# Patient Record
Sex: FEMALE | Race: WHITE | NOT HISPANIC OR LATINO | Employment: FULL TIME | URBAN - METROPOLITAN AREA
[De-identification: names, ages, dates, MRNs, and addresses within clinical notes are randomized per-mention and may not be internally consistent; named-entity substitution may affect disease eponyms.]

---

## 2017-01-18 ENCOUNTER — OUTPATIENT INFUSION SERVICES (OUTPATIENT)
Dept: ONCOLOGY | Facility: MEDICAL CENTER | Age: 72
End: 2017-01-18
Attending: FAMILY MEDICINE
Payer: MEDICARE

## 2017-01-18 VITALS
BODY MASS INDEX: 20.37 KG/M2 | HEIGHT: 66 IN | OXYGEN SATURATION: 95 % | HEART RATE: 74 BPM | WEIGHT: 126.76 LBS | DIASTOLIC BLOOD PRESSURE: 89 MMHG | TEMPERATURE: 97.2 F | SYSTOLIC BLOOD PRESSURE: 129 MMHG | RESPIRATION RATE: 18 BRPM

## 2017-01-18 LAB
CA-I BLD ISE-SCNC: 1.14 MMOL/L (ref 1.1–1.3)
CREAT BLD-MCNC: 0.6 MG/DL (ref 0.5–1.4)

## 2017-01-18 PROCEDURE — 700111 HCHG RX REV CODE 636 W/ 250 OVERRIDE (IP): Performed by: FAMILY MEDICINE

## 2017-01-18 PROCEDURE — 82565 ASSAY OF CREATININE: CPT

## 2017-01-18 PROCEDURE — 82330 ASSAY OF CALCIUM: CPT

## 2017-01-18 PROCEDURE — 96401 CHEMO ANTI-NEOPL SQ/IM: CPT

## 2017-01-18 RX ORDER — CALCIUM CARBONATE 500(1250)
500 TABLET ORAL 2 TIMES DAILY WITH MEALS
COMMUNITY
End: 2017-06-19

## 2017-01-18 RX ADMIN — DENOSUMAB 60 MG: 60 INJECTION SUBCUTANEOUS at 14:26

## 2017-01-18 NOTE — PROGRESS NOTES
Pt arrived to [facility], ambulatory, for Prolia injection. Pt denies any recent or upcoming dental surgeries. Labs drawn with 23g butterfly needle in the R-AC, pt within parameters to treat today. Prolia injected into the R-upper arm with no s/sx of adverse reaction. Pt left [facility] with no s/sx of distress. Follow up appointment scheduled and confirmed.

## 2017-01-18 NOTE — PROGRESS NOTES
Pharmacy Note:    Scr = 0.6    with est crcl ~ 69ml/min(min Scr = 0.7)  iCalcium =   1.14      Ok to proceed with prolia injection  DEQUAN Regalado Pharm.D.

## 2017-02-09 ENCOUNTER — NON-PROVIDER VISIT (OUTPATIENT)
Dept: NEUROLOGY | Facility: MEDICAL CENTER | Age: 72
End: 2017-02-09
Payer: MEDICARE

## 2017-02-09 DIAGNOSIS — G90.50 COMPLEX REGIONAL PAIN SYNDROME TYPE 1, AFFECTING UNSPECIFIED SITE: ICD-10-CM

## 2017-02-09 PROCEDURE — 95909 NRV CNDJ TST 5-6 STUDIES: CPT | Performed by: PSYCHIATRY & NEUROLOGY

## 2017-02-09 PROCEDURE — 95885 MUSC TST DONE W/NERV TST LIM: CPT | Mod: 59 | Performed by: PSYCHIATRY & NEUROLOGY

## 2017-02-09 PROCEDURE — 95886 MUSC TEST DONE W/N TEST COMP: CPT | Mod: 59 | Performed by: PSYCHIATRY & NEUROLOGY

## 2017-02-09 NOTE — MR AVS SNAPSHOT
"        Jessica Mandel   2017 3:15 PM   Appointment   MRN: [id number]    Department:  [facility]   Dept Phone:  [phone]    Description:  Female : 1945   Provider:  [facility]           Allergies as of 2017     Allergen Noted Reactions    Benadryl [Altaryl] 2014       \"Makes me hyper\"    Other Environmental 2014   Runny Nose    seasonal hayfever,       Vital Signs     Smoking Status                   Never Smoker            Basic Information     Date Of Birth Sex Race Ethnicity Preferred Language    1945 Female White Non- English      Your appointments     2017  1:30 PM   Est Injection with INFUSION QUICK INJECT   Infusion Services ([facility])    [address] [facility] [address]  [place] [address] [phone]              Problem List              ICD-10-CM Priority Class Noted - Resolved    Generalized pain R52   2014 - Present    Nonunion of fracture [id number]   2014 - Present      Health Maintenance        Date Due Completion Dates    IMM DTaP/Tdap/Td Vaccine (1 - Tdap) 1966 ---    PAP SMEAR 1968 ---    COLONOSCOPY 1997 ---    IMM ZOSTER VACCINE 2007 ---    IMM PNEUMOCOCCAL 65+ (ADULT) LOW/MEDIUM RISK SERIES (1 of 2 - PCV13) 2012 ---    IMM INFLUENZA (1) 2016 ---    MAMMOGRAM 2017, 2015, 2012, 10/8/2010, 10/18/2005    BONE DENSITY 10/4/2021 10/4/2016, 10/2/2015, 10/27/2014, 10/24/2013, 10/8/2010, 10/18/2005            Current Immunizations     No immunizations on file.      Below and/or attached are the medications your provider expects you to take. Review all of your home medications and newly ordered medications with your provider and/or pharmacist. Follow medication instructions as directed by your provider and/or pharmacist. Please keep your medication list with you and share with your provider. Update the information when medications are discontinued, doses are changed, or new " medications (including over-the-counter products) are added; and carry medication information at all times in the event of emergency situations     Allergies:  BENADRYL - (reactions not documented)     OTHER ENVIRONMENTAL - Runny Nose               Medications  Valid as of: February 09, 2017 -  4:43 PM    Generic Name Brand Name Tablet Size Instructions for use    ALPRAZolam (Tab) XANAX 0.5 MG Take 0.5 mg by mouth 2 times a day as needed.        Ascorbic Acid (Tab) Vitamin C 1000 MG Take  by mouth every day.        Aspirin (Tab) aspirin 81 MG Take 81 mg by mouth every day.        Calcium-Magnesium (Tab) Calcium-Magnesium 500-250 MG Take 1 Tab by mouth 2 Times a Day.        Cholecalciferol (Cap) VITAMIN D3 5000 UNIT Take 10,000 Units by mouth every day.        Cholecalciferol (Tab) cholecalciferol 1000 UNIT Take 1,000 Units by mouth every day.        Coenzyme Q10 (Cap) Coenzyme Q10 200 MG Take  by mouth every day.        Gabapentin (Cap) NEURONTIN 400 MG Take 400 mg by mouth 3 times a day.        Glucosamine-Chondroitin-MSM   Take 1 Tab by mouth every day.        Losartan Potassium (Tab) COZAAR 100 MG Take 100 mg by mouth every morning.        Melatonin (Tab) Melatonin 10 MG Take 1 Tab by mouth every evening.        Omega-3 Fatty Acids   Take 1 Tab by mouth.        Oxycodone-Acetaminophen (Tab) PERCOCET 5-325 MG Take 1-2 Tabs by mouth 2 Times a Day.        Oyster Shell (Tab) OS-RADHA 500 500 MG Take 500 mg by mouth 2 times a day, with meals.        Vitamin E (Cap) VITAMIN E 400 UNIT Take 400 Units by mouth every day. With tocopherols        Zolpidem Tartrate (Tab) AMBIEN 10 MG Take 10 mg by mouth at bedtime as needed.        .                 Medicines prescribed today were sent to:     [facility]/PHARMACY #[id number] - [place], [place] - [address]    [address] [place] [address]    Phone: [phone] Fax: [phone]    Open 24 Hours?: No      Medication refill instructions:       If your prescription bottle indicates  you have medication refills left, it is not necessary to call your provider’s office. Please contact your pharmacy and they will refill your medication.    If your prescription bottle indicates you do not have any refills left, you may request refills at any time through one of the following ways: The online Typesafe system (except Urgent Care), by calling your provider’s office, or by asking your pharmacy to contact your provider’s office with a refill request. Medication refills are processed only during regular business hours and may not be available until the next business day. Your provider may request additional information or to have a follow-up visit with you prior to refilling your medication.   *Please Note: Medication refills are assigned a new Rx number when refilled electronically. Your pharmacy may indicate that no refills were authorized even though a new prescription for the same medication is available at the pharmacy. Please request the medicine by name with the pharmacy before contacting your provider for a refill.           Typesafe Access Code: Activation code not generated  Current Typesafe Status: Active

## 2017-02-10 NOTE — PROCEDURES
DATE OF SERVICE:    The patient is having a lot of left foot pain since the patient had ankle   surgery in the past 1+ year.  The NCV/EMG shows possible left tarsal tunnel   severely reduced amplitude and also muscle atrophy and denervation or   reactivation change over the left abductor hallucis.  Also, some reinnervation   change on the left proximal muscle as well and ankle reflex was also absent   in the left.    INTERPRETATION:  This NCV/EMG is consistent with:  1.  Left medial tarsal tunnel syndrome.  2.  Superimposed some chronic lumbar radiculopathy may be possible too on the   left side; however, the patient did not have left back pain and the major   symptoms the patient has is secondary to the distal nerve damage in the tarsal   tunnel and the muscle is atrophied in the tarsal tunnel innervated foot   muscles.  Advised capsaicin ointment.  Advised podiatrist evaluation and   further surgery over the left tarsal tunnel syndrome would be the best result   and may be of help if that is really what the patient wants.       ____________________________________     MD SAMY VANG / NEY    DD:  02/09/2017 16:41:53  DT:  02/09/2017 17:56:34    D#:  [id number]  Job#:  [id number]    cc: Amor Moreno MD

## 2017-02-11 NOTE — PROCEDURES
DATE OF SERVICE:  02/09/2017    The patient is having a lot of left foot pain since the patient had ankle   surgery  3 year ago.     [facility]  Neurodiagnostic Laboratory  [facility] [place] [address] [phone]      Patient: CELE SINGLETON  Medical Record: [id number]  YOB: 1945  Age: 71 Years 4 Months  Referring MD: TAURUS Truong MD: REJI  Indication: G90.529  Notes: [facility]#[id number]      Sensory NCS      Nerve / Sites Rec. Site Onset Lat Peak Lat NP Amp PP Amp SPAR Segments Distance Velocity     ms ms µV µV %  cm m/s   L SURAL - Lat Mall-PENG      Calf Lat Mall NR NR NR NR NR Calf - Lat Mall 14 NR   R SURAL - Lat Mall-PENG      Calf Lat Mall NR NR NR NR NR Calf - Lat Mall 14 NR           Motor NCS      Nerve / Sites Rec. Site Lat Amp Rel Amp SPAR Segments Dist. Gonzalo     ms mV % %  cm m/s   R COMM PERONEAL - EDB-PENG      Ankle EDB 3.55 3.6 100 100 Ankle - EDB 9       Knee EDB 12.15 2.8 76.4 100 Knee - Ankle 37 43.0   L COMM PERONEAL - EDB-PENG      Ankle EDB 3.80 2.9 100 78.5 Ankle - EDB 9       Knee EDB 10.95 2.3 80.1 82.3 Knee - Ankle 32 44.8   R TIBIAL (KNEE) - EHB-PENG      Ankle EHB 3.10 8.3 100 100 Ankle - EHB 9       Knee EHB 12.45 5.9 70.2 100 Knee - Ankle 36 38.5   L TIBIAL (KNEE) - EHB-PENG      Ankle EHB 3.35 0.8 100 9.76 Ankle - EHB 9       Knee EHB 12.90 1.2 153 21.3 Knee - Ankle 39 40.8               EMG Summary Table     Spontaneous MUAP Recruitment    IA Fib PSW Fasc H.F. Amp Dur. PPP Pattern   L. VAST MEDIALIS N None None None None N N N N   L. VAST LATERALIS N None None None None 1+ 1+ N N   L. TIB ANTERIOR N None None None None N N N N   L. GASTROCN (MED) N None None None None N N N N   L. ABD  HALLUCIS N None None None None 2+ 2+ 1+ N   R. ABD  HALLUCIS N None None None None N N N N       SUMMARY:    The NCV/EMG shows possible left tarsal tunnel   severely reduced amplitude and also muscle atrophy and denervation or   reactivation change over the left  abductor hallucis.  Also, some reinnervation   change on the left proximal muscle as well and ankle reflex was also absent   in the left.    IMPRESSION:    Probable Left medial tarsal tunnel syndrome  May try Capsaicin ointment  Or even consider surgery      Superimposed some chronic lumbar radiculopathy may be possible too on the   left side; however, the patient did not have left back pain and the major   Symptoms are not related with lumbar radiculopathies either    Advised capsaicin ointment.  Advised podiatrist evaluation and   further surgery over the left tarsal tunnel syndrome is possible too       ____________________________________     MD SAMY VANG / NEY    DD:  02/09/2017 16:41:53  DT:  02/09/2017 17:56:34    D#:  [id number]  Job#:  [id number]    cc: Amor Moreno MD

## 2017-02-15 ENCOUNTER — OFFICE VISIT (OUTPATIENT)
Dept: NEUROLOGY | Facility: MEDICAL CENTER | Age: 72
End: 2017-02-15
Payer: MEDICARE

## 2017-02-15 VITALS
WEIGHT: 128.6 LBS | RESPIRATION RATE: 14 BRPM | TEMPERATURE: 99.1 F | HEART RATE: 82 BPM | OXYGEN SATURATION: 95 % | BODY MASS INDEX: 21.43 KG/M2 | DIASTOLIC BLOOD PRESSURE: 78 MMHG | HEIGHT: 65 IN | SYSTOLIC BLOOD PRESSURE: 110 MMHG

## 2017-02-15 DIAGNOSIS — G57.52 TARSAL TUNNEL SYNDROME, LEFT: ICD-10-CM

## 2017-02-15 PROBLEM — G57.50 TARSAL TUNNEL SYNDROME: Status: ACTIVE | Noted: 2017-02-15

## 2017-02-15 PROCEDURE — G8419 CALC BMI OUT NRM PARAM NOF/U: HCPCS | Performed by: PSYCHIATRY & NEUROLOGY

## 2017-02-15 PROCEDURE — 3014F SCREEN MAMMO DOC REV: CPT | Performed by: PSYCHIATRY & NEUROLOGY

## 2017-02-15 PROCEDURE — 1101F PT FALLS ASSESS-DOCD LE1/YR: CPT | Mod: 8P | Performed by: PSYCHIATRY & NEUROLOGY

## 2017-02-15 PROCEDURE — G8484 FLU IMMUNIZE NO ADMIN: HCPCS | Performed by: PSYCHIATRY & NEUROLOGY

## 2017-02-15 PROCEDURE — 1036F TOBACCO NON-USER: CPT | Performed by: PSYCHIATRY & NEUROLOGY

## 2017-02-15 PROCEDURE — 4040F PNEUMOC VAC/ADMIN/RCVD: CPT | Mod: 8P | Performed by: PSYCHIATRY & NEUROLOGY

## 2017-02-15 PROCEDURE — 3017F COLORECTAL CA SCREEN DOC REV: CPT | Mod: 8P | Performed by: PSYCHIATRY & NEUROLOGY

## 2017-02-15 PROCEDURE — 99214 OFFICE O/P EST MOD 30 MIN: CPT | Performed by: PSYCHIATRY & NEUROLOGY

## 2017-02-15 PROCEDURE — G8432 DEP SCR NOT DOC, RNG: HCPCS | Performed by: PSYCHIATRY & NEUROLOGY

## 2017-02-15 RX ORDER — CARBAMAZEPINE 200 MG/1
200 TABLET, EXTENDED RELEASE ORAL 2 TIMES DAILY
Qty: 60 TAB | Refills: 3 | Status: SHIPPED | OUTPATIENT
Start: 2017-02-15 | End: 2017-04-03

## 2017-02-15 RX ORDER — CITALOPRAM 20 MG/1
20 TABLET ORAL DAILY
COMMUNITY
End: 2017-04-03

## 2017-02-15 ASSESSMENT — PAIN SCALES - GENERAL: PAINLEVEL: NO PAIN

## 2017-02-15 NOTE — PATIENT INSTRUCTIONS
IMPRESSION:    1. Left tarsal tunnel syndrome after ankle surgery 3 years ago  2. Intractable pain over left foot due to 1      PLAN/RECOMMENDATIONS:    Advise surgery consultation  Will add Tegretol for pain management  Tegretol 200mg Daily for 3 days, then up to 200mg two times per day  Stop if any side effects, call us if not helpful    SIGNATURE:  Sunshine Martines      CC:  Hasmukh Higginbotham M.D.

## 2017-02-15 NOTE — MR AVS SNAPSHOT
"        Jessica Mandel   2/15/2017 8:20 AM   Office Visit   MRN: [id number]    Department:  [facility]   Dept Phone:  [phone]    Description:  Female : 1945   Provider:  Sunshine Martines M.D.           Reason for Visit     Follow-Up Nerve pain      Allergies as of 2/15/2017     Allergen Noted Reactions    Benadryl [Altaryl] 2014       \"Makes me hyper\"    Other Environmental 2014   Runny Nose    seasonal hayfever,       You were diagnosed with     Tarsal tunnel syndrome, left   [124997]         Vital Signs     Blood Pressure Pulse Temperature Respirations Height Weight    110/78 mmHg 82 37.3 °C (99.1 °F) 14 1.651 m (5' 5\") 58.333 kg (128 lb 9.6 oz)    Body Mass Index Oxygen Saturation Smoking Status             21.40 kg/m2 95% Never Smoker          Basic Information     Date Of Birth Sex Race Ethnicity Preferred Language    1945 Female White Non- English      Your appointments     2017  1:30 PM   Est Injection with INFUSION QUICK INJECT   Infusion Services ([facility])    [address] [facility] [address]  [place] [address]   [phone]              Problem List              ICD-10-CM Priority Class Noted - Resolved    Generalized pain R52   2014 - Present    Nonunion of fracture [id number]   2014 - Present    Tarsal tunnel syndrome G57.50   2/15/2017 - Present      Health Maintenance        Date Due Completion Dates    IMM DTaP/Tdap/Td Vaccine (1 - Tdap) 1966 ---    PAP SMEAR 1968 ---    COLONOSCOPY 1997 ---    IMM ZOSTER VACCINE 2007 ---    IMM PNEUMOCOCCAL 65+ (ADULT) LOW/MEDIUM RISK SERIES (1 of 2 - PCV13) 2012 ---    IMM INFLUENZA (1) 2016 ---    MAMMOGRAM 2017, 2015, 2012, 10/8/2010, 10/18/2005    BONE DENSITY 10/4/2021 10/4/2016, 10/2/2015, 10/27/2014, 10/24/2013, 10/8/2010, 10/18/2005            Current Immunizations     No immunizations on file.      Below and/or attached are the medications your provider " expects you to take. Review all of your home medications and newly ordered medications with your provider and/or pharmacist. Follow medication instructions as directed by your provider and/or pharmacist. Please keep your medication list with you and share with your provider. Update the information when medications are discontinued, doses are changed, or new medications (including over-the-counter products) are added; and carry medication information at all times in the event of emergency situations     Allergies:  BENADRYL - (reactions not documented)     OTHER ENVIRONMENTAL - Runny Nose               Medications  Valid as of: February 15, 2017 -  9:00 AM    Generic Name Brand Name Tablet Size Instructions for use    ALPRAZolam (Tab) XANAX 0.5 MG Take 0.5 mg by mouth 2 times a day as needed.        Ascorbic Acid (Tab) Vitamin C 1000 MG Take  by mouth every day.        Aspirin (Tab) aspirin 81 MG Take 81 mg by mouth every day.        Calcium-Magnesium (Tab) Calcium-Magnesium 500-250 MG Take 1 Tab by mouth 2 Times a Day.        CarBAMazepine (TABLET SR 12 HR) TEGRETOL  MG Take 1 Tab by mouth 2 times a day.        Cholecalciferol (Cap) VITAMIN D3 5000 UNIT Take 10,000 Units by mouth every day.        Cholecalciferol (Tab) cholecalciferol 1000 UNIT Take 1,000 Units by mouth every day.        Citalopram Hydrobromide (Tab) CELEXA 20 MG Take 20 mg by mouth every day.        Coenzyme Q10 (Cap) Coenzyme Q10 200 MG Take  by mouth every day.        Gabapentin (Cap) NEURONTIN 400 MG Take 400 mg by mouth 3 times a day.        Glucosamine-Chondroitin-MSM   Take 1 Tab by mouth every day.        Losartan Potassium (Tab) COZAAR 100 MG Take 100 mg by mouth every morning.        Melatonin (Tab) Melatonin 10 MG Take 1 Tab by mouth every evening.        Omega-3 Fatty Acids   Take 1 Tab by mouth.        Oxycodone-Acetaminophen (Tab) PERCOCET 5-325 MG Take 1-2 Tabs by mouth 2 Times a Day.        Oyster Shell (Tab) OS-RADHA 500 500 MG  Take 500 mg by mouth 2 times a day, with meals.        Vitamin E (Cap) VITAMIN E 400 UNIT Take 400 Units by mouth every day. With tocopherols        Zolpidem Tartrate (Tab) AMBIEN 10 MG Take 10 mg by mouth at bedtime as needed.        .                 Medicines prescribed today were sent to:     [facility]/PHARMACY #[id number] - [place], [place] - [address]    [address] [place] [address]    Phone: [phone] Fax: [phone]    Open 24 Hours?: No      Medication refill instructions:       If your prescription bottle indicates you have medication refills left, it is not necessary to call your provider’s office. Please contact your pharmacy and they will refill your medication.    If your prescription bottle indicates you do not have any refills left, you may request refills at any time through one of the following ways: The online [employer] system (except Urgent Care), by calling your provider’s office, or by asking your pharmacy to contact your provider’s office with a refill request. Medication refills are processed only during regular business hours and may not be available until the next business day. Your provider may request additional information or to have a follow-up visit with you prior to refilling your medication.   *Please Note: Medication refills are assigned a new Rx number when refilled electronically. Your pharmacy may indicate that no refills were authorized even though a new prescription for the same medication is available at the pharmacy. Please request the medicine by name with the pharmacy before contacting your provider for a refill.        Instructions          IMPRESSION:    1. Left tarsal tunnel syndrome after ankle surgery 3 years ago  2. Intractable pain over left foot due to 1      PLAN/RECOMMENDATIONS:    Advise surgery consultation  Will add Tegretol for pain management  Tegretol 200mg Daily for 3 days, then up to 200mg two times per day  Stop if any side effects, call us if not  helpful    SIGNATURE:  Sunshine Martines      CC:  Hasmukh Higginbotham M.D.              MyChart Access Code: Activation code not generated  Current MyChart Status: Active

## 2017-02-15 NOTE — PROGRESS NOTES
NEUROLOGY NOTE    Referring Physician  Hasmukh Higginbotham      CHIEF COMPLAINT:      The patient is having a lot of left foot pain since the patient had ankle    surgery in the past 3 year.  The NCV/EMG shows possible left tarsal tunnel    severely reduced amplitude and also muscle atrophy and denervation or    reactivation change over the left abductor hallucis.  Also, some reinnervation   change on the left proximal muscle as well and ankle reflex was also absent    in the left.  Chief Complaint   Patient presents with   • Follow-Up     Nerve pain       PRESENT ILLNESS:   The patient is having a lot of left foot pain since the patient had ankle    surgery in the past 3 year.  The NCV/EMG shows possible left tarsal tunnel    severely reduced amplitude and also muscle atrophy and denervation or    reactivation change over the left abductor hallucis.  Also, some reinnervation   change on the left proximal muscle as well and ankle reflex was also absent    in the left.    PAST MEDICAL HISTORY:  Past Medical History   Diagnosis Date   • Hypertension    • Cholesterol blood decreased    • Seasonal and perennial allergic rhinitis      hayfever uses otc rx   • Arthritis    • Dental disorder      dental implants, upper partial   • Pain      left ankle,neck,shoulder   • High cholesterol    • Psychiatric problem      anxiety/depression seeing therapist       PAST SURGICAL HISTORY:  Past Surgical History   Procedure Laterality Date   • Breast biopsy  1990     right breast, benign   • Pr enlarge breast with implant    1986   • Inj,sacroiliac,anes/steroid  4/2/2014     Performed by Amor Moreno M.D. at [facility]   • Gyn surgery  1985     hysterectomy   • Bone graft  8/29/2014     Performed by Hernesto Garcia M.D. at [facility]   • Ankle arthroscopy  8/29/2014     Performed by Hernesto Garcia M.D. at [facility]   • Knee arthroscopy  2007     LEFT KNEE   • Hardware removal ortho Left  "6/24/2015     Procedure: HARDWARE REMOVAL ORTHO, Ankle;  Surgeon: Hernesto Garcia M.D.;  Location: [facility];  Service:        FAMILY HISTORY:  No family history on file.    SOCIAL HISTORY:  Social History     Social History   • Marital Status:      Spouse Name: N/A   • Number of Children: N/A   • Years of Education: N/A     Occupational History   • Not on file.     Social History Main Topics   • Smoking status: Never Smoker    • Smokeless tobacco: Never Used   • Alcohol Use: No   • Drug Use: No   • Sexual Activity: No     Other Topics Concern   • Not on file     Social History Narrative     ALLERGIES:  Allergies   Allergen Reactions   • Benadryl [Altaryl]      \"Makes me hyper\"   • Other Environmental Runny Nose     seasonal hayfever,      TOBHX  History   Smoking status   • Never Smoker    Smokeless tobacco   • Never Used     ALCHX  History   Alcohol Use No     DRUGHX  History   Drug Use No           MEDICATIONS:  Current Outpatient Prescriptions   Medication   • citalopram (CELEXA) 20 MG Tab   • calcium carbonate (CALCIUM CARBONATE) 500 MG Tab   • vitamin D (CHOLECALCIFEROL) 1000 UNIT Tab   • alprazolam (XANAX) 0.5 MG TABS   • gabapentin (NEURONTIN) 400 MG CAPS   • Ascorbic Acid (VITAMIN C) 1000 MG TABS   • Coenzyme Q10 200 MG CAPS   • Calcium-Magnesium 500-250 MG TABS   • aspirin 81 MG tablet   • oxycodone-acetaminophen (PERCOCET) 5-325 MG TABS   • cholecalciferol (D3 MAXIMUM STRENGTH) 5000 UNIT CAPS   • vitamin e (VITAMIN E) 400 UNIT CAPS   • Omega-3 Fatty Acids (OMEGA 3 PO)   • Glucosamine-Chondroitin-MSM (TRIPLE FLEX PO)   • zolpidem (AMBIEN) 10 MG TABS   • losartan (COZAAR) 100 MG TABS   • Melatonin 10 MG TABS     No current facility-administered medications for this visit.       REVIEW OF SYSTEM:    Constitutional: Denies fevers, Denies weight changes   Eyes: Denies changes in vision, no eye pain   Ears/Nose/Throat/Mouth: Denies nasal congestion or sore throat   Cardiovascular: Denies " "chest pain or palpitations   Respiratory: Denies SOB.   Gastrointestinal/Hepatic: Denies abdominal pain, nausea, vomiting, diarrhea, constipation or GI bleeding   Genitourinary: Denies bladder dysfunction, dysuria or frequency   Musculoskeletal/Rheum: Denies joint pain and swelling   Skin/Breast: Denies rash, denies breast lumps or discharge   Neurological: left foot pain  Psychiatric: denies mood disorder   Endocrine: denies hx of diabetes or thyroid dysfunction   Heme/Oncology/Lymph Nodes: Denies enlarged lymph nodes, denies brusing or known bleeding disorder   Allergic/Immunologic: Denies hx of allergies         PHYSICAL AND NEUROLOGICAL EXMAINATIONS:  VITAL SIGNS: /78 mmHg  Pulse 82  Temp(Src) 37.3 °C (99.1 °F)  Resp 14  Ht 1.651 m (5' 5\")  Wt 58.333 kg (128 lb 9.6 oz)  BMI 21.40 kg/m2  SpO2 95%  CURRENT WEIGHT:   BMI: Body mass index is 21.4 kg/(m^2).  PREVIOUS WEIGHTS:  Wt Readings from Last 25 Encounters:   02/15/17 58.333 kg (128 lb 9.6 oz)   01/18/17 57.5 kg (126 lb 12.2 oz)   02/29/16 58.06 kg (128 lb)   09/29/15 56.065 kg (123 lb 9.6 oz)   09/06/15 58.968 kg (130 lb)   09/01/15 58.06 kg (128 lb)   08/03/15 59.875 kg (132 lb)   06/24/15 60.2 kg (132 lb 11.5 oz)   08/29/14 66.8 kg (147 lb 4.3 oz)       General appearance of patient: WDWN(+) NAD(+)    EYES  o Fundus : Papilledem(-) Exudates(-) Hemorrhage(-)  Nervous System  Orientation to time, place and person(+)  Memory normal(+)  Language: aphasia(-)  Knowledge: past(+) Current(+)  Attention(+)  Cranial Nerves  • Nerve 2: intact  • Nerve 3,4,6: intact  • Nerve 5 : intact  • Nerve 7: intact  • Nerve 8: intact  • Nerve 9 & 10: intact  • Nerve 11: intact  • Nerve 12: intact  Muscle Power and muscle tone: left abductor hallucis brevis --atrophy and weak  Sensory System: Pin sensation intact(+)  Reflexes: symmetric throughout  Cerebellar Function FNP normal   Gait : Steady(+) TandemGait steady(+)  Heart and Vascular  Peripheral Vasucular system : " Edema (-) Swelling(-)  RHB, Breathing sound clear  abdomen bowel sound normoactive  Extremities freely moveable  Joints no contracture             IMPRESSION:    1. Left tarsal tunnel syndrome after ankle surgery 3 years ago  2. Intractable pain over left foot due to 1      PLAN/RECOMMENDATIONS:    Advise surgery consultation  Will add Tegretol for pain management  Tegretol 200mg Daily for 3 days, then up to 200mg two times per day  Stop if any side effects, call us if not helpful    SIGNATURE:  Sunshine Martines      CC:  Hasmukh Higginbotham M.D.

## 2017-03-08 ENCOUNTER — HOSPITAL ENCOUNTER (OUTPATIENT)
Dept: RADIOLOGY | Facility: MEDICAL CENTER | Age: 72
End: 2017-03-08
Attending: NURSE PRACTITIONER
Payer: MEDICARE

## 2017-03-08 DIAGNOSIS — M54.2 CERVICALGIA: ICD-10-CM

## 2017-03-08 PROCEDURE — 72050 X-RAY EXAM NECK SPINE 4/5VWS: CPT

## 2017-03-22 ENCOUNTER — HOSPITAL ENCOUNTER (OUTPATIENT)
Dept: LAB | Facility: MEDICAL CENTER | Age: 72
End: 2017-03-22
Attending: NURSE PRACTITIONER
Payer: MEDICARE

## 2017-03-22 LAB
BUN SERPL-MCNC: 17 MG/DL (ref 8–22)
CREAT SERPL-MCNC: 0.61 MG/DL (ref 0.5–1.4)

## 2017-03-22 PROCEDURE — 36415 COLL VENOUS BLD VENIPUNCTURE: CPT

## 2017-03-22 PROCEDURE — 84520 ASSAY OF UREA NITROGEN: CPT

## 2017-03-22 PROCEDURE — 82565 ASSAY OF CREATININE: CPT

## 2017-03-28 ENCOUNTER — APPOINTMENT (OUTPATIENT)
Dept: RADIOLOGY | Facility: MEDICAL CENTER | Age: 72
End: 2017-03-28
Attending: PHYSICIAN ASSISTANT
Payer: MEDICARE

## 2017-03-28 DIAGNOSIS — R22.41 MASS OF RIGHT KNEE: ICD-10-CM

## 2017-03-28 DIAGNOSIS — M25.561 RIGHT KNEE PAIN, UNSPECIFIED CHRONICITY: ICD-10-CM

## 2017-03-28 PROCEDURE — 700117 HCHG RX CONTRAST REV CODE 255: Performed by: PHYSICIAN ASSISTANT

## 2017-03-28 PROCEDURE — A9579 GAD-BASE MR CONTRAST NOS,1ML: HCPCS | Performed by: PHYSICIAN ASSISTANT

## 2017-03-28 PROCEDURE — 73723 MRI JOINT LWR EXTR W/O&W/DYE: CPT | Mod: RT

## 2017-03-28 RX ADMIN — GADODIAMIDE 12 ML: 287 INJECTION INTRAVENOUS at 09:16

## 2017-04-03 ENCOUNTER — HOSPITAL ENCOUNTER (OUTPATIENT)
Dept: PAIN MANAGEMENT | Facility: REHABILITATION | Age: 72
End: 2017-04-03
Attending: ANESTHESIOLOGY
Payer: MEDICARE

## 2017-04-03 ENCOUNTER — HOSPITAL ENCOUNTER (OUTPATIENT)
Dept: RADIOLOGY | Facility: REHABILITATION | Age: 72
End: 2017-04-03
Attending: ANESTHESIOLOGY
Payer: MEDICARE

## 2017-04-03 VITALS
HEIGHT: 66 IN | DIASTOLIC BLOOD PRESSURE: 90 MMHG | OXYGEN SATURATION: 95 % | RESPIRATION RATE: 17 BRPM | WEIGHT: 125.66 LBS | HEART RATE: 81 BPM | SYSTOLIC BLOOD PRESSURE: 134 MMHG | TEMPERATURE: 98.2 F | BODY MASS INDEX: 20.2 KG/M2

## 2017-04-03 PROCEDURE — 64491 INJ PARAVERT F JNT C/T 2 LEV: CPT

## 2017-04-03 PROCEDURE — 64492 INJ PARAVERT F JNT C/T 3 LEV: CPT

## 2017-04-03 PROCEDURE — 64490 INJ PARAVERT F JNT C/T 1 LEV: CPT

## 2017-04-03 PROCEDURE — 700117 HCHG RX CONTRAST REV CODE 255

## 2017-04-03 PROCEDURE — 99152 MOD SED SAME PHYS/QHP 5/>YRS: CPT

## 2017-04-03 PROCEDURE — 700111 HCHG RX REV CODE 636 W/ 250 OVERRIDE (IP)

## 2017-04-03 RX ORDER — MIDAZOLAM HYDROCHLORIDE 1 MG/ML
INJECTION INTRAMUSCULAR; INTRAVENOUS
Status: COMPLETED
Start: 2017-04-03 | End: 2017-04-03

## 2017-04-03 RX ORDER — METHYLPREDNISOLONE ACETATE 80 MG/ML
INJECTION, SUSPENSION INTRA-ARTICULAR; INTRALESIONAL; INTRAMUSCULAR; SOFT TISSUE
Status: COMPLETED
Start: 2017-04-03 | End: 2017-04-03

## 2017-04-03 RX ORDER — BUPIVACAINE HYDROCHLORIDE 2.5 MG/ML
INJECTION, SOLUTION EPIDURAL; INFILTRATION; INTRACAUDAL
Status: COMPLETED
Start: 2017-04-03 | End: 2017-04-03

## 2017-04-03 RX ADMIN — METHYLPREDNISOLONE ACETATE 80 MG: 80 INJECTION, SUSPENSION INTRA-ARTICULAR; INTRALESIONAL; INTRAMUSCULAR; SOFT TISSUE at 11:28

## 2017-04-03 RX ADMIN — MIDAZOLAM HYDROCHLORIDE 1 MG: 1 INJECTION, SOLUTION INTRAMUSCULAR; INTRAVENOUS at 11:19

## 2017-04-03 RX ADMIN — FENTANYL CITRATE 50 MCG: 50 INJECTION, SOLUTION INTRAMUSCULAR; INTRAVENOUS at 11:19

## 2017-04-03 RX ADMIN — MIDAZOLAM HYDROCHLORIDE 0.5 MG: 1 INJECTION, SOLUTION INTRAMUSCULAR; INTRAVENOUS at 11:25

## 2017-04-03 RX ADMIN — FENTANYL CITRATE 25 MCG: 50 INJECTION, SOLUTION INTRAMUSCULAR; INTRAVENOUS at 11:25

## 2017-04-03 RX ADMIN — BUPIVACAINE HYDROCHLORIDE 2 ML: 2.5 INJECTION, SOLUTION EPIDURAL; INFILTRATION; INTRACAUDAL; PERINEURAL at 11:28

## 2017-04-03 RX ADMIN — IOHEXOL 2 ML: 240 INJECTION, SOLUTION INTRATHECAL; INTRAVASCULAR; INTRAVENOUS; ORAL at 11:28

## 2017-04-03 ASSESSMENT — PAIN SCALES - GENERAL
PAINLEVEL_OUTOF10: 8
PAINLEVEL_OUTOF10: 4
PAINLEVEL_OUTOF10: 5

## 2017-04-03 NOTE — PROGRESS NOTES
Medication reconciliation reviewed with patient denied taking any blood thinners  and any anti- inflammatories medication.Patient stated that she's  been  off  Aspirin 81 mg.  for  5 days . Discharge instruction given to patient and verbalized understanding. Patient  has ride home ( Jacob/ son /  ). Dr. Moreno notified.

## 2017-04-07 ENCOUNTER — HOSPITAL ENCOUNTER (OUTPATIENT)
Dept: RADIOLOGY | Facility: MEDICAL CENTER | Age: 72
End: 2017-04-07
Attending: NURSE PRACTITIONER
Payer: MEDICARE

## 2017-04-07 DIAGNOSIS — M25.572 LEFT ANKLE PAIN, UNSPECIFIED CHRONICITY: ICD-10-CM

## 2017-04-07 DIAGNOSIS — G57.52 TARSAL TUNNEL SYNDROME OF LEFT SIDE: ICD-10-CM

## 2017-04-07 PROCEDURE — 73723 MRI JOINT LWR EXTR W/O&W/DYE: CPT | Mod: LT

## 2017-04-07 PROCEDURE — A9579 GAD-BASE MR CONTRAST NOS,1ML: HCPCS | Performed by: NURSE PRACTITIONER

## 2017-04-07 PROCEDURE — 700117 HCHG RX CONTRAST REV CODE 255: Performed by: NURSE PRACTITIONER

## 2017-04-07 PROCEDURE — 73720 MRI LWR EXTREMITY W/O&W/DYE: CPT | Mod: LT

## 2017-04-07 RX ADMIN — GADODIAMIDE 10 ML: 287 INJECTION INTRAVENOUS at 14:49

## 2017-06-19 ENCOUNTER — HOSPITAL ENCOUNTER (OUTPATIENT)
Dept: PAIN MANAGEMENT | Facility: REHABILITATION | Age: 72
End: 2017-06-19
Attending: ANESTHESIOLOGY
Payer: MEDICARE

## 2017-06-19 ENCOUNTER — HOSPITAL ENCOUNTER (OUTPATIENT)
Dept: RADIOLOGY | Facility: REHABILITATION | Age: 72
End: 2017-06-19
Attending: ANESTHESIOLOGY
Payer: MEDICARE

## 2017-06-19 VITALS
RESPIRATION RATE: 19 BRPM | SYSTOLIC BLOOD PRESSURE: 119 MMHG | OXYGEN SATURATION: 95 % | DIASTOLIC BLOOD PRESSURE: 79 MMHG | HEIGHT: 66 IN | WEIGHT: 120.81 LBS | TEMPERATURE: 98.8 F | HEART RATE: 64 BPM | BODY MASS INDEX: 19.42 KG/M2

## 2017-06-19 PROCEDURE — 64634 DESTROY C/TH FACET JNT ADDL: CPT

## 2017-06-19 PROCEDURE — 64633 DESTROY CERV/THOR FACET JNT: CPT

## 2017-06-19 PROCEDURE — 99152 MOD SED SAME PHYS/QHP 5/>YRS: CPT

## 2017-06-19 PROCEDURE — 700101 HCHG RX REV CODE 250

## 2017-06-19 PROCEDURE — 700111 HCHG RX REV CODE 636 W/ 250 OVERRIDE (IP)

## 2017-06-19 RX ORDER — MIDAZOLAM HYDROCHLORIDE 1 MG/ML
INJECTION INTRAMUSCULAR; INTRAVENOUS
Status: COMPLETED
Start: 2017-06-19 | End: 2017-06-19

## 2017-06-19 RX ORDER — LIDOCAINE HYDROCHLORIDE 20 MG/ML
INJECTION, SOLUTION EPIDURAL; INFILTRATION; INTRACAUDAL; PERINEURAL
Status: COMPLETED
Start: 2017-06-19 | End: 2017-06-19

## 2017-06-19 RX ORDER — BUPIVACAINE HYDROCHLORIDE 5 MG/ML
INJECTION, SOLUTION EPIDURAL; INTRACAUDAL
Status: COMPLETED
Start: 2017-06-19 | End: 2017-06-19

## 2017-06-19 RX ADMIN — MIDAZOLAM HYDROCHLORIDE 1 MG: 1 INJECTION, SOLUTION INTRAMUSCULAR; INTRAVENOUS at 11:00

## 2017-06-19 RX ADMIN — BUPIVACAINE HYDROCHLORIDE 5 ML: 5 INJECTION, SOLUTION EPIDURAL; INTRACAUDAL; PERINEURAL at 11:10

## 2017-06-19 RX ADMIN — FENTANYL CITRATE 50 MCG: 50 INJECTION, SOLUTION INTRAMUSCULAR; INTRAVENOUS at 11:00

## 2017-06-19 RX ADMIN — LIDOCAINE HYDROCHLORIDE 5 ML: 20 INJECTION, SOLUTION EPIDURAL; INFILTRATION; INTRACAUDAL; PERINEURAL at 11:10

## 2017-06-19 ASSESSMENT — PAIN SCALES - GENERAL
PAINLEVEL_OUTOF10: 1
PAINLEVEL_OUTOF10: 2
PAINLEVEL_OUTOF10: 8

## 2017-06-19 NOTE — PROGRESS NOTES
Medication reconciliation reviewed with patient denied any anti- inflammatories medication.Patient stated that she's  been  off  Aspirin  81 mg. for  2 days  Discharge instruction given to patient and verbalized understanding. Patient  has ride home ( [place]/ son /  ).Patient had Xanax 0.5 mg. and Percocet 10/325 mg today at 0400 AM Dr. Moreno notified.

## 2017-06-19 NOTE — PROGRESS NOTES
Patient positioned pre-procedure by RN,CNA and xray tech. Pillow placed under lower legs and feet for support.Grounding pad applied to right calf by CNA and verified by RN.

## 2017-06-26 ENCOUNTER — APPOINTMENT (OUTPATIENT)
Dept: PAIN MANAGEMENT | Facility: REHABILITATION | Age: 72
End: 2017-06-26
Attending: ANESTHESIOLOGY
Payer: MEDICARE

## 2017-07-25 ENCOUNTER — OUTPATIENT INFUSION SERVICES (OUTPATIENT)
Dept: ONCOLOGY | Facility: MEDICAL CENTER | Age: 72
End: 2017-07-25
Attending: FAMILY MEDICINE
Payer: MEDICARE

## 2017-07-25 VITALS
TEMPERATURE: 97.6 F | BODY MASS INDEX: 20.73 KG/M2 | RESPIRATION RATE: 18 BRPM | WEIGHT: 128.97 LBS | DIASTOLIC BLOOD PRESSURE: 78 MMHG | HEIGHT: 66 IN | SYSTOLIC BLOOD PRESSURE: 132 MMHG | OXYGEN SATURATION: 96 % | HEART RATE: 93 BPM

## 2017-07-25 PROCEDURE — 306780 HCHG STAT FOR TRANSFUSION PER CASE

## 2017-07-25 NOTE — PROGRESS NOTES
"Pt arrived to [facility], ambulatory, for Prolia injection. Pt states that last week she had \"8 teeth removed\" to prepare for dental implants. Pt educated about risks of prolia injection too close to major dental work. Pt states she may be having more work done in her jaw and will call to reschedule 2 months after that. Pt left [facility] with no s/sx of distress. Follow up appointment to be scheduled by pt after procedures.  "

## 2018-06-20 ENCOUNTER — HOSPITAL ENCOUNTER (OUTPATIENT)
Dept: RADIOLOGY | Facility: MEDICAL CENTER | Age: 73
End: 2018-06-20
Attending: OBSTETRICS & GYNECOLOGY
Payer: MEDICARE

## 2018-06-20 DIAGNOSIS — Z12.31 VISIT FOR SCREENING MAMMOGRAM: ICD-10-CM

## 2018-06-20 PROCEDURE — 77067 SCR MAMMO BI INCL CAD: CPT

## 2018-08-21 ENCOUNTER — APPOINTMENT (OUTPATIENT)
Dept: ONCOLOGY | Facility: MEDICAL CENTER | Age: 73
End: 2018-08-21
Attending: NURSE PRACTITIONER
Payer: MEDICARE

## 2018-08-23 ENCOUNTER — APPOINTMENT (OUTPATIENT)
Dept: RADIOLOGY | Facility: MEDICAL CENTER | Age: 73
End: 2018-08-23
Attending: NURSE PRACTITIONER
Payer: MEDICARE

## 2018-08-30 ENCOUNTER — HOSPITAL ENCOUNTER (OUTPATIENT)
Dept: LAB | Facility: MEDICAL CENTER | Age: 73
End: 2018-08-30
Attending: OBSTETRICS & GYNECOLOGY
Payer: MEDICARE

## 2018-08-30 LAB — CYTOLOGY REG CYTOL: NORMAL

## 2018-08-30 PROCEDURE — 88175 CYTOPATH C/V AUTO FLUID REDO: CPT

## 2022-06-04 ENCOUNTER — TELEPHONE ENCOUNTER (OUTPATIENT)
Dept: URBAN - METROPOLITAN AREA CLINIC 68 | Facility: CLINIC | Age: 77
End: 2022-06-04

## 2022-06-05 ENCOUNTER — TELEPHONE ENCOUNTER (OUTPATIENT)
Dept: URBAN - METROPOLITAN AREA CLINIC 68 | Facility: CLINIC | Age: 77
End: 2022-06-05

## 2022-06-25 ENCOUNTER — TELEPHONE ENCOUNTER (OUTPATIENT)
Age: 77
End: 2022-06-25

## 2022-06-26 ENCOUNTER — TELEPHONE ENCOUNTER (OUTPATIENT)
Age: 77
End: 2022-06-26